# Patient Record
Sex: MALE | Race: WHITE | URBAN - METROPOLITAN AREA
[De-identification: names, ages, dates, MRNs, and addresses within clinical notes are randomized per-mention and may not be internally consistent; named-entity substitution may affect disease eponyms.]

---

## 2021-06-05 ENCOUNTER — HOSPITAL ENCOUNTER (EMERGENCY)
Facility: CLINIC | Age: 26
Discharge: LEFT BEFORE DISCHARGE | End: 2021-06-05

## 2021-06-05 VITALS
HEART RATE: 100 BPM | RESPIRATION RATE: 17 BRPM | TEMPERATURE: 98.9 F | DIASTOLIC BLOOD PRESSURE: 105 MMHG | SYSTOLIC BLOOD PRESSURE: 150 MMHG | OXYGEN SATURATION: 99 %

## 2021-06-05 NOTE — ED TRIAGE NOTES
Pt states injured left elbow on 05/24. Pt states now having worsening pain and swelling to posterior left elbow. Pt denies fever/chills/nausea/vomiting. ABCs intact GCS 15

## 2021-06-05 NOTE — ED NOTES
Pt informed registration staff he no longer wished to wait to see a provider. Patient did not discuss desire to LWBS with nursing staff prior to departure or allow the opportunity for a refusal of MSE form to be provided for signing.